# Patient Record
Sex: MALE | Race: WHITE | Employment: FULL TIME | URBAN - METROPOLITAN AREA
[De-identification: names, ages, dates, MRNs, and addresses within clinical notes are randomized per-mention and may not be internally consistent; named-entity substitution may affect disease eponyms.]

---

## 2024-07-19 ENCOUNTER — HOSPITAL ENCOUNTER (EMERGENCY)
Facility: HOSPITAL | Age: 54
Discharge: HOME | End: 2024-07-19
Attending: EMERGENCY MEDICINE
Payer: COMMERCIAL

## 2024-07-19 VITALS
RESPIRATION RATE: 18 BRPM | OXYGEN SATURATION: 100 % | HEIGHT: 68 IN | DIASTOLIC BLOOD PRESSURE: 69 MMHG | TEMPERATURE: 99 F | HEART RATE: 80 BPM | SYSTOLIC BLOOD PRESSURE: 112 MMHG | BODY MASS INDEX: 19.05 KG/M2 | WEIGHT: 125.69 LBS

## 2024-07-19 DIAGNOSIS — T14.8XXA BRUISING: ICD-10-CM

## 2024-07-19 DIAGNOSIS — H10.33 ACUTE BACTERIAL CONJUNCTIVITIS OF BOTH EYES: Primary | ICD-10-CM

## 2024-07-19 DIAGNOSIS — H40.052 RAISED INTRAOCULAR PRESSURE OF LEFT EYE: ICD-10-CM

## 2024-07-19 PROCEDURE — 99283 EMERGENCY DEPT VISIT LOW MDM: CPT

## 2024-07-19 PROCEDURE — 99284 EMERGENCY DEPT VISIT MOD MDM: CPT

## 2024-07-19 RX ORDER — TOBRAMYCIN 3 MG/ML
2 SOLUTION/ DROPS OPHTHALMIC EVERY 4 HOURS
Qty: 1 EACH | Refills: 0 | Status: SHIPPED | OUTPATIENT
Start: 2024-07-19 | End: 2024-07-24

## 2024-07-19 RX ORDER — TETRACAINE HYDROCHLORIDE 5 MG/ML
1 SOLUTION OPHTHALMIC ONCE
Status: COMPLETED | OUTPATIENT
Start: 2024-07-19 | End: 2024-07-19

## 2024-07-19 NOTE — ED INITIAL ASSESSMENT (HPI)
To the ED via walk-in with c/o bilateral knee swelling a couple of weeks ago. Right knee became twice the size, left knee 1.5x size. Turned black and appeared bruised. Right arm swelled up and appeared bruised as well. Symptoms are now gone per patient. Same time patient had vision issue in his left eye. Visio was blurred, would clear up in the afternoon and then become blurred again in the evening. Left eye is still blurred here in triage. Patient main concern today is his eyes, and why this all happened in the first place.

## 2024-07-19 NOTE — ED PROVIDER NOTES
Patient Seen in: [facility] Emergency Department      History     Chief Complaint   Patient presents with    Eye Visual Problem    Swelling Edema     Stated Complaint: Bilateral knee swelling, bruising , right forearm swelling/bruising, vision iss*    Subjective:   HPI    Mr. Whelan, presented with a sudden onset of multiple symptoms that occurred approximately a week ago. He reported that his knees swelled up significantly, with one knee swelling to twice its normal size and the other to about 1.5 times its normal size. Both knees turned black and were surrounded by bruising, without any reported injury. Around the same time, his right arm also swelled up and developed a large bruise just above the elbow. The discoloration and swelling in these areas have since largely resolved. Simultaneously, Mr. Whelan experienced significant swelling in his left eye, to the point where he was unable to open it for several days. The swelling was localized to the eyelids, not the eyeball itself. The right eye remained unaffected. He attempted to alleviate the swelling with cold water compresses, which seemed to help, but the issue has not completely resolved. He reported that his left eye is still somewhat swollen and his vision in that eye is blurred. The blurriness varies throughout the day, being worst in the morning and improving slightly in the afternoon, and resolving in the evening. Despite these issues, he reported no pain in the eye. Mr. Whelan also mentioned that his left eye was a bit bloodshot and had some drainage, which was worse in the morning. He described the drainage as being somewhat purulent and causing his eyelashes to stick together. Despite these symptoms, he reported no changes in his general health and has not taken any over-the-counter or prescription medications. He also denied any recent illness or coughing, although he did mention that he used to smoke cigarettes but has significantly reduced his  smoking.    Objective:   History reviewed. No pertinent past medical history.           History reviewed. No pertinent surgical history.             Social History     Socioeconomic History    Marital status: Single   Tobacco Use    Smoking status: Every Day     Types: Cigarettes    Smokeless tobacco: Never   Vaping Use    Vaping status: Never Used   Substance and Sexual Activity    Alcohol use: Yes    Drug use: Never              Review of Systems    Positive for stated Chief Complaint: Eye Visual Problem and Swelling Edema    Other systems are as noted in HPI.  Constitutional and vital signs reviewed.      All other systems reviewed and negative except as noted above.    Physical Exam     ED Triage Vitals [07/19/24 1101]   /69   Pulse 80   Resp 18   Temp 98.8 °F (37.1 °C)   Temp src Temporal   SpO2 100 %   O2 Device None (Room air)       Current Vitals:   Vital Signs  BP: 112/69  Pulse: 80  Resp: 18  Temp: 98.8 °F (37.1 °C)  Temp src: Temporal    Oxygen Therapy  SpO2: 100 %  O2 Device: None (Room air)            Physical Exam    General: Alert and oriented x3, appears somewhat unkempt.  HEENT: Normocephalic, atraumatic, pupils equal round and reactive to light, EOMI, no significant periorbital edema/erythema, some purulent discharge on the lashes bilaterally, bilateral conjunctival erythema, slight haziness of the cornea on the left, no abnormal uptake of fluorescein on the left but some hazy uptake on the right without discrete lesion, IOP on the right 9 and on the left 15-18, visual acuity is 20/50 on the left and 20/30 on the right, posterior oropharynx clear, poor dentition.  Neck: Supple.  Cardiovascular: Regular rate and rhythm.  Respiratory: Lungs clear to auscultation.  Extremities: No CCE.  Skin: Old appearing bruise to right bicep region.    ED Course     Labs Reviewed - No data to display        Medications   tetracaine (Pontocaine) 0.5 % ophthalmic solution 1 drop (1 drop Both Eyes Given by Other  7/19/24 1144)   fluorescein sodium (Ful-Racquel) 1 MG ophthalmic strip (  Given by Other 7/19/24 1150)          MDM      Patient presents with bruising and vision changes/red eye.  Differential diagnosis includes but is not limited to thrombocytopenia, acute conjunctivitis, acute glaucoma and periorbital cellulitis.  The patient does have evidence of conjunctivitis bilaterally.  He does not have evidence of periorbital cellulitis.  He does not have any vision loss.  He is more blurry in the left eye and has an elevated intraocular pressure on the left as compared to the right.  He does not have significant pain and the pressures are not high enough to be consistent with acute angle-closure glaucoma.  I counseled the patient that I will start him on topical antibiotics but have encouraged him strongly to follow-up as soon as possible with ophthalmology for a full exam.  He expressed understanding.  I discussed with him at length doing lab workup to further evaluate his other complaints, in particular the bruising.  He does not want to do any lab work today.  He does not have a primary care doctor that he follows up with for any routine health maintenance.  I strongly encouraged him to follow-up with the PCP to establish care and have at least some baseline lab testing done.  He expressed understanding.  He displays decision-making capacity and would like to be discharged.                           Medical Decision Making      Disposition and Plan     Clinical Impression:  1. Acute bacterial conjunctivitis of both eyes    2. Raised intraocular pressure of left eye    3. Bruising         Disposition:  Discharge  7/19/2024 12:10 pm    Follow-up:  Carlos Agudelo MD  [address] [facility] [address]  [phone]    Schedule an appointment as soon as possible for a visit in 3 day(s)      Blane Braden MD  [address]  [address]  [facility] [id number] [phone]    Schedule an appointment as soon as  possible for a visit in 3 day(s)            Medications Prescribed:  Discharge Medication List as of 7/19/2024 12:19 PM        START taking these medications    Details   tobramycin 0.3 % Ophthalmic Solution Place 2 drops into both eyes every 4 (four) hours for 5 days., Print, Disp-1 each, R-0